# Patient Record
Sex: FEMALE | Race: BLACK OR AFRICAN AMERICAN | NOT HISPANIC OR LATINO | Employment: UNEMPLOYED | URBAN - METROPOLITAN AREA
[De-identification: names, ages, dates, MRNs, and addresses within clinical notes are randomized per-mention and may not be internally consistent; named-entity substitution may affect disease eponyms.]

---

## 2024-01-22 ENCOUNTER — HOSPITAL ENCOUNTER (OUTPATIENT)
Dept: RADIOLOGY | Facility: EXTERNAL LOCATION | Age: 39
Discharge: HOME | End: 2024-01-22

## 2024-01-25 DIAGNOSIS — R92.8 ABNORMAL FINDING ON BREAST IMAGING: ICD-10-CM

## 2024-01-25 NOTE — PROGRESS NOTES
[facility]  Jade Ybarra female   1985 38 y.o.  [id number]      Chief Complaint  New patient, biopsy consultation.    History Of Present Illness  Jade Ybarra is a very pleasant 38 y.o. AA female seen in the breast center for biopsy consultation. She denies breast surgery or biopsy. She has family history of breast cancer in 2 paternal cousins.     BREAST IMAGIN2024 Left diagnostic mammogram with ultrasound, indicates BI-RADS Category 4. Suspicious ill-defined left breast mass at 2 o'clock, 6 cm from the nipple without axillary lymphadenopathy. Further evaluation with surgical consultation and ultrasound-guided biopsy is recommended. Benign simple cyst in the left breast at 2 o'clock, 12 cm from the nipple. Clinical follow-up is recommended.    REPRODUCTIVE HISTORY: menarche age 13, , first birth age 21,  x 7 months, no OCP's, premenopausal, LMP 26, heterogeneously dense                        FAMILY CANCER HISTORY:   Paternal Cousin x 2: Breast cancer, unknown ages    Review of Systems  Constitutional:  Negative for appetite change, fatigue, fever and unexpected weight change.   HENT:  Negative for ear pain, hearing loss, nosebleeds, sore throat and trouble swallowing.    Eyes:  Negative for discharge, itching and visual disturbance.   Breast: As stated in HPI.  Respiratory:  Negative for cough, chest tightness and shortness of breath.    Cardiovascular:  Negative for chest pain, palpitations and leg swelling.   Gastrointestinal:  Negative for abdominal pain, constipation, diarrhea and nausea.   Endocrine: Negative for cold intolerance and heat intolerance.   Genitourinary:  Negative for dysuria, frequency, hematuria, pelvic pain and vaginal bleeding.   Musculoskeletal:  Negative for arthralgias, back pain, gait problem, joint swelling and myalgias.   Skin:  Negative for color change and rash.   Allergic/Immunologic: Negative for environmental allergies and food  allergies.   Neurological:  Negative for dizziness, tremors, speech difficulty, weakness, numbness and headaches.   Hematological:  Does not bruise/bleed easily.   Psychiatric/Behavioral:  Negative for agitation, dysphoric mood and sleep disturbance. The patient is not nervous/anxious.       Past Medical History  She has no past medical history on file.    Surgical History  She has no past surgical history on file.    Family History  Cancer-related family history is not on file.     Social History  Social History     Tobacco Use    Smoking status: Not on file    Smokeless tobacco: Not on file   Substance Use Topics    Alcohol use: Not on file      Allergies  No Known Allergies    Medications  No current outpatient medications     Last Recorded Vitals  Blood pressure 106/73, pulse 65, temperature 36.6 °C (97.9 °F), temperature source Skin, weight 104 kg (229 lb).      Physical Exam  Chest:       Patient is alert and oriented x3 and in a relaxed and appropriate mood. Her gait is steady and hand grasps are equal. Sclera is clear. The breasts are nearly symmetrical. Left breast 2:00, 6 cm from the nipple is a 1.5 x 1.5 cm mass, firm, round, and mobile. Left breast 2:00, 12 cm from the nipple 3.5 x 4.0 cm mass, soft, round, and mobile. The right breast tissue is soft without palpable abnormalities, discrete nodules or masses. The skin and nipples appear normal. There is no cervical, supraclavicular or axillary lymphadenopathy. Heart rate and rhythm normal, S1 and S2 appreciated. The lungs are clear to auscultation bilaterally. Abdomen is soft and non-tender.      Relevant Results and Imaging  Study Result    Narrative & Impression   Interpreted By:  Joann Escamilla,   STUDY:  BI MAMMO LEFT DIAGNOSTIC TOMOSYNTHESIS; BI US BREAST LIMITED LEFT;  1/29/2024 11:20 am; 1/29/2024 10:30 am      ACCESSION NUMBER(S):  [id number]; [id number]      ORDERING CLINICIAN:  GARTH PABLO      INDICATION:  Re-evaluation of a left  breast mass seen on recent outside facility  ultrasound as recommended on second opinion interpretation performed  at our institution. Patient was initially evaluated for a palpable  area of clinical concern in the left breast likely corresponding to a  benign cyst on ultrasound.      COMPARISON:  Outside facility mammogram and ultrasound dated 01/2/2024.      FINDINGS:  ULTRASOUND: Targeted ultrasound with elastography was performed by a  registered sonographer in the left breast and left axilla.          There is an ill-defined irregular hypoechoic mass with posterior  shadowing in the left breast at 2 o'clock, 6 cm from the nipple  measuring approximately 1.3 x 1.5 x 1.1 cm. No internal vascularity  is identified. The finding demonstrates areas of stiffness on  elastography. A BB marker was placed on the skin over this finding  and additional mammographic views were obtained.      A benign simple cyst is also identified in the left breast at 2  o'clock, 12 cm from the nipple measuring 3.5 x 1.6 x 3.9 cm. This  likely corresponds to the palpable area of clinical concern felt by  the patient.      Scanning of the left axilla demonstrates 3 morphologically normal  lymph nodes with preserved fatty hilum and normal cortical thickness.      MAMMOGRAPHY: 2D and tomosynthesis images were reviewed at 1 mm slice  thickness.      Density:  The breast tissue is heterogeneously dense, which may  obscure small masses.      No definite mammographic abnormality is identified to correspond with  the suspicious mass seen on ultrasound at 2 o'clock, 6 cm from the  nipple. An oval circumscribed mass in upper-outer quadrant of the  left breast at posterior depth corresponds with a benign cyst on  ultrasound. There are additional oval circumscribed masses in the  left breast similar to the contralateral breast most consistent with  benign cysts.          IMPRESSION:  1. Suspicious ill-defined left breast mass at 2 o'clock, 6 cm  from  the nipple without axillary lymphadenopathy. Further evaluation with  surgical consultation and ultrasound-guided biopsy is recommended.  2. Benign simple cyst in the left breast at 2 o'clock, 12 cm from the  nipple. Clinical follow-up is recommended.      Dr. Joann Escamilla discussed the findings and recommendations with  the patient at the time of exam. A message was sent to the referring  practitioner at the time of this dictation regarding these findings  using the epic critical findings reporting system. A pre-procedure  form was filled out.      Method of Detection: Category Pat - Patient Reported Self-examination  Finding      BI-RADS CATEGORY:      BI-RADS Category:  4 Suspicious.  Recommendation:  Ultrasound - Guided Breast Biopsy.  Recommended Date:  Immediate.  Laterality:  Left.      For any future breast imaging appointments, please call [phone] ([phone]).          MACRO:  Critical Finding:  Breast Imaging Abnormality. Notification was  initiated on 1/29/2024 at 2:14 pm by  Joann Escamilla.  (**-YCF-**)  Instructions:  Surgical Consultation and Imaging Guided Biopsy.      Signed by: Joann Escamilla 1/29/2024 2:16 PM     Time was spent viewing digital images. I explained the results in depth, along with suggested explanation for follow up recommendations based on the testing results. BI-RADS Category 4    Visit Diagnosis  1. Abnormal finding on breast imaging          Assessment/Plan  Abnormal mammogram, left breast mass, benign left breast cyst, no breast surgery or biopsy, family history of breast cancer, heterogeneously dense    Plan:  Left breast ultrasound guided core biopsy.    Patient Discussion/Summary  Proceed to biopsy. A breast radiology physician will perform the biopsy. Results are usually available in about 7 business days. I will call patient with results and instruct on next steps and plan.     IMPORTANT INFORMATION REGARDING YOUR RESULTS    If you receive medical information  from My [facility] Personal Health Record (online chart) your results will be released into your chart. This means you may view or see results of your biopsy or procedure before I contact you directly. If this occurs, please call the office and we will discuss your results over the phone.    You can see your health information, review clinical summaries from office visits & test results online when you follow your health with MY  Chart, a personal health record. To sign up go to [email or web address]. If you need assistance with signing up or trouble getting into your account call [facility] Patient Line 24/7 at [phone].    My office phone number is [phone]  if you need to get in touch with me or have additional questions or concerns. Thank you for choosing [facility] and trusting me as your healthcare provider. I look forward to seeing you again at your next office visit. I am honored to be a provider on your health care team and I remain dedicated to helping you achieve your health goals.       Roopa Syed, FRANKO-CNP

## 2024-01-29 ENCOUNTER — HOSPITAL ENCOUNTER (OUTPATIENT)
Dept: RADIOLOGY | Facility: CLINIC | Age: 39
Discharge: HOME | End: 2024-01-29
Payer: COMMERCIAL

## 2024-01-29 DIAGNOSIS — R92.8 ABNORMAL FINDING ON BREAST IMAGING: ICD-10-CM

## 2024-01-29 PROCEDURE — 77061 BREAST TOMOSYNTHESIS UNI: CPT | Mod: LT

## 2024-01-29 PROCEDURE — 76982 USE 1ST TARGET LESION: CPT | Mod: LT

## 2024-01-29 PROCEDURE — 76642 ULTRASOUND BREAST LIMITED: CPT | Mod: LEFT SIDE | Performed by: STUDENT IN AN ORGANIZED HEALTH CARE EDUCATION/TRAINING PROGRAM

## 2024-01-29 PROCEDURE — 77065 DX MAMMO INCL CAD UNI: CPT | Mod: LEFT SIDE | Performed by: STUDENT IN AN ORGANIZED HEALTH CARE EDUCATION/TRAINING PROGRAM

## 2024-01-29 PROCEDURE — 76642 ULTRASOUND BREAST LIMITED: CPT | Mod: LT

## 2024-01-29 PROCEDURE — G0279 TOMOSYNTHESIS, MAMMO: HCPCS | Mod: LEFT SIDE | Performed by: STUDENT IN AN ORGANIZED HEALTH CARE EDUCATION/TRAINING PROGRAM

## 2024-01-30 ENCOUNTER — TELEPHONE (OUTPATIENT)
Dept: SURGICAL ONCOLOGY | Facility: HOSPITAL | Age: 39
End: 2024-01-30
Payer: COMMERCIAL

## 2024-01-30 NOTE — TELEPHONE ENCOUNTER
Result Communication    Patient has a scheduled apt for biopsy consultation 1/31/2024.    Resulted Orders   BI US breast limited left    Narrative    Interpreted By:  Joann Escamilla,   STUDY:  BI MAMMO LEFT DIAGNOSTIC TOMOSYNTHESIS; BI US BREAST LIMITED LEFT;  1/29/2024 11:20 am; 1/29/2024 10:30 am      ACCESSION NUMBER(S):  [id number]; [id number]      ORDERING CLINICIAN:  GARTH PABLO      INDICATION:  Re-evaluation of a left breast mass seen on recent outside facility  ultrasound as recommended on second opinion interpretation performed  at our institution. Patient was initially evaluated for a palpable  area of clinical concern in the left breast likely corresponding to a  benign cyst on ultrasound.      COMPARISON:  Outside facility mammogram and ultrasound dated 01/2/2024.      FINDINGS:  ULTRASOUND: Targeted ultrasound with elastography was performed by a  registered sonographer in the left breast and left axilla.          There is an ill-defined irregular hypoechoic mass with posterior  shadowing in the left breast at 2 o'clock, 6 cm from the nipple  measuring approximately 1.3 x 1.5 x 1.1 cm. No internal vascularity  is identified. The finding demonstrates areas of stiffness on  elastography. A BB marker was placed on the skin over this finding  and additional mammographic views were obtained.      A benign simple cyst is also identified in the left breast at 2  o'clock, 12 cm from the nipple measuring 3.5 x 1.6 x 3.9 cm. This  likely corresponds to the palpable area of clinical concern felt by  the patient.      Scanning of the left axilla demonstrates 3 morphologically normal  lymph nodes with preserved fatty hilum and normal cortical thickness.      MAMMOGRAPHY: 2D and tomosynthesis images were reviewed at 1 mm slice  thickness.      Density:  The breast tissue is heterogeneously dense, which may  obscure small masses.      No definite mammographic abnormality is identified to correspond with  the  suspicious mass seen on ultrasound at 2 o'clock, 6 cm from the  nipple. An oval circumscribed mass in upper-outer quadrant of the  left breast at posterior depth corresponds with a benign cyst on  ultrasound. There are additional oval circumscribed masses in the  left breast similar to the contralateral breast most consistent with  benign cysts.            Impression    1. Suspicious ill-defined left breast mass at 2 o'clock, 6 cm from  the nipple without axillary lymphadenopathy. Further evaluation with  surgical consultation and ultrasound-guided biopsy is recommended.  2. Benign simple cyst in the left breast at 2 o'clock, 12 cm from the  nipple. Clinical follow-up is recommended.      Dr. Joann Escamilla discussed the findings and recommendations with  the patient at the time of exam. A message was sent to the referring  practitioner at the time of this dictation regarding these findings  using the epic critical findings reporting system. A pre-procedure  form was filled out.      Method of Detection: Category Pat - Patient Reported Self-examination  Finding      BI-RADS CATEGORY:      BI-RADS Category:  4 Suspicious.  Recommendation:  Ultrasound - Guided Breast Biopsy.  Recommended Date:  Immediate.  Laterality:  Left.      For any future breast imaging appointments, please call [phone]  ([phone]).          MACRO:  Critical Finding:  Breast Imaging Abnormality. Notification was  initiated on 1/29/2024 at 2:14 pm by  Joann Escamilla.  (**-YCF-**)  Instructions:  Surgical Consultation and Imaging Guided Biopsy.      Signed by: Joann Escamilla 1/29/2024 2:16 PM  Dictation workstation:   [id number]         10:52 AM

## 2024-01-31 ENCOUNTER — PROCEDURE VISIT (OUTPATIENT)
Dept: SURGICAL ONCOLOGY | Facility: CLINIC | Age: 39
End: 2024-01-31
Payer: COMMERCIAL

## 2024-01-31 ENCOUNTER — HOSPITAL ENCOUNTER (OUTPATIENT)
Dept: RADIOLOGY | Facility: CLINIC | Age: 39
Discharge: HOME | End: 2024-01-31
Payer: COMMERCIAL

## 2024-01-31 VITALS
SYSTOLIC BLOOD PRESSURE: 106 MMHG | WEIGHT: 229 LBS | DIASTOLIC BLOOD PRESSURE: 73 MMHG | TEMPERATURE: 97.9 F | HEART RATE: 65 BPM

## 2024-01-31 DIAGNOSIS — R92.8 ABNORMAL FINDING ON BREAST IMAGING: ICD-10-CM

## 2024-01-31 DIAGNOSIS — R92.8 ABNORMAL FINDING ON BREAST IMAGING: Primary | ICD-10-CM

## 2024-01-31 DIAGNOSIS — R92.8 OTHER ABNORMAL AND INCONCLUSIVE FINDINGS ON DIAGNOSTIC IMAGING OF BREAST: ICD-10-CM

## 2024-01-31 PROCEDURE — 19083 BX BREAST 1ST LESION US IMAG: CPT | Mod: LEFT SIDE | Performed by: STUDENT IN AN ORGANIZED HEALTH CARE EDUCATION/TRAINING PROGRAM

## 2024-01-31 PROCEDURE — 99204 OFFICE O/P NEW MOD 45 MIN: CPT | Performed by: NURSE PRACTITIONER

## 2024-01-31 PROCEDURE — 99214 OFFICE O/P EST MOD 30 MIN: CPT | Mod: 27 | Performed by: NURSE PRACTITIONER

## 2024-01-31 PROCEDURE — 2500000005 HC RX 250 GENERAL PHARMACY W/O HCPCS: Performed by: STUDENT IN AN ORGANIZED HEALTH CARE EDUCATION/TRAINING PROGRAM

## 2024-01-31 PROCEDURE — A4648 IMPLANTABLE TISSUE MARKER: HCPCS

## 2024-01-31 PROCEDURE — 77065 DX MAMMO INCL CAD UNI: CPT

## 2024-01-31 PROCEDURE — 19083 BX BREAST 1ST LESION US IMAG: CPT | Mod: LT

## 2024-01-31 PROCEDURE — 2720000007 HC OR 272 NO HCPCS

## 2024-01-31 PROCEDURE — 88305 TISSUE EXAM BY PATHOLOGIST: CPT | Mod: TC,SUR,AHULAB | Performed by: NURSE PRACTITIONER

## 2024-01-31 PROCEDURE — 88305 TISSUE EXAM BY PATHOLOGIST: CPT | Performed by: STUDENT IN AN ORGANIZED HEALTH CARE EDUCATION/TRAINING PROGRAM

## 2024-01-31 PROCEDURE — 77065 DX MAMMO INCL CAD UNI: CPT | Mod: LEFT SIDE | Performed by: STUDENT IN AN ORGANIZED HEALTH CARE EDUCATION/TRAINING PROGRAM

## 2024-01-31 PROCEDURE — 2720000003 HC TRAY FOLEY SILER W URIMETER

## 2024-01-31 RX ADMIN — Medication 10 ML: at 13:15

## 2024-01-31 ASSESSMENT — PAIN SCALES - GENERAL
PAINLEVEL_OUTOF10: 0 - NO PAIN
PAINLEVEL_OUTOF10: 1
PAINLEVEL_OUTOF10: 0 - NO PAIN
PAINLEVEL: 0-NO PAIN

## 2024-01-31 ASSESSMENT — PAIN - FUNCTIONAL ASSESSMENT: PAIN_FUNCTIONAL_ASSESSMENT: 0-10

## 2024-01-31 NOTE — DISCHARGE INSTRUCTIONS
AFTER THE TEST  A steri-strip and bandage will be placed over the incision. You may shower after 24 hours. Remove bandage after 24 hours. Remove bandage after the shower. Leave the steri-strips in place to fall off on their own. If after 1 week the steri-strips are still on, you may remove them. Avoid swimming or soaking in tub for 3 days.     You may have mild discomfort at the test site. If needed, you may take Tylenol (Acetaminophen) for pain. Please avoid taking NSAIDs, Motrin, Advil, Aleve, or ibuprofen for 24 hours following the biopsy. After 24 hours you may resume NSAIDSs.     If you take aspirin, Plavix, Coumadin, Xarelto or Eliquis please tell us. If these medications were stopped by your provider, please ask them when to resume.     You may have some tenderness, bruising or slight bleeding at the site. Please apply ice packs to the site for 15 minutes on and 15 minutes off for a 2 hour minimum.     Most people can return to their usual routine after the procedure. Avoid Strenuous activity for 24 hours.     Sleep in a bra the night after your biopsy. Continue to do so for comfort.     Call your doctor if you have any of the following symptoms :  Fever  Increased pain  Increased bleeding  Redness  Increased swelling  Yellowish drainage  Your doctor will get the biopsy results within 5 - 7 days. Call your doctor with any questions.     Patient education brochure and pain/comfort measures have been reviewed.   Phone number provided to contact Breast Center if problems arise.     Patient verbalized understanding of home going instructions.    Patient left breast center ambulatory at 1355.

## 2024-01-31 NOTE — Clinical Note
Pressure held x 10 minutes, incision dry, steri strips intact and compression dressing applied. Ice pack applied.

## 2024-01-31 NOTE — Clinical Note
Procedural steps explained and patient given opportunity to verbalize concerns and seek clarification.  Post procedure self-care and potential for bruising , hematoma, and pain reviewed.  Patient verbalizes understanding.    Patient offered aromatherapy, warm blankets and music. Guided imagery, touch and relaxation breathing to be used throughout the procedure.

## 2024-02-07 LAB
LABORATORY COMMENT REPORT: NORMAL
PATH REPORT.FINAL DX SPEC: NORMAL
PATH REPORT.GROSS SPEC: NORMAL
PATH REPORT.RELEVANT HX SPEC: NORMAL
PATH REPORT.TOTAL CANCER: NORMAL

## 2024-02-09 ENCOUNTER — TELEPHONE (OUTPATIENT)
Dept: SURGICAL ONCOLOGY | Facility: HOSPITAL | Age: 39
End: 2024-02-09
Payer: COMMERCIAL

## 2024-02-09 NOTE — TELEPHONE ENCOUNTER
"Result Communication    Spoke with Jade Ybarra regarding breast biopsy results, benign and concordant. Return to PCP for annual exams and start mammograms at the age of 42.      Resulted Orders   Surgical Pathology Exam   Result Value Ref Range    Case Report       Surgical Pathology                                Case: [id number]                                  Authorizing Provider:  OLIVERIO Martin    Collected:           01/31/2024 1328              Ordering Location:     [facility]       Received:            01/31/2024 1426                                     Center                                                                       Pathologist:           Breanna Recinos MD                                                            Specimen:    BREAST CORE BIOPSY LEFT, LEFT BREAST 2:00 6CM FN                                           FINAL DIAGNOSIS       A.  LEFT BREAST, 2:00, 6 CM FROM NIPPLE, ULTRASOUND-GUIDED CORE NEEDLE BIOPSY:  --STROMAL FIBROSIS AND FOCAL PSEUDOANGIOMATOUS STROMAL HYPERPLASIA, SEE NOTE  --BENIGN BREAST TISSUE WITH PERIDUCTAL AND PERILOBULAR CHRONIC INFLAMMATION    Note: Multiple deeper levels are examined. Features of a mass-forming lesion are not identified in this sample.  Clinical and radiologic correlation is recommended.  This case has been reviewed at breast intradepartmental consensus conference via Zoom meeting.              By the signature on this report, the individual or group listed as making the Final Interpretation/Diagnosis certifies that they have reviewed this case.       Clinical History       ULTRASOUND GUIDED CORE BIOPSY OF LEFT BREAST 2:00 6 CM FN      Gross Description       A: Received in formalin, labeled with the patient´s name and hospital number and \"left breast\", are multiple irregular/cylindrical segments of yellow-white fatty soft tissue aggregating to 1.7 x 1.0 x 0.2 cm.  The specimen is submitted in toto in 2 cassettes.  SMS    NOTE: "  Ischemia time: 1/31/2024 01:28 PM.  This specimen was placed into formalin at: 1/31/2024 01:29 PM.         1:52 PM